# Patient Record
Sex: FEMALE | Race: WHITE | NOT HISPANIC OR LATINO | URBAN - METROPOLITAN AREA
[De-identification: names, ages, dates, MRNs, and addresses within clinical notes are randomized per-mention and may not be internally consistent; named-entity substitution may affect disease eponyms.]

---

## 2017-06-25 ENCOUNTER — EMERGENCY (EMERGENCY)
Facility: HOSPITAL | Age: 67
LOS: 1 days | Discharge: HOME | End: 2017-06-25
Attending: PERSONAL EMERGENCY RESPONSE ATTENDANT | Admitting: PERSONAL EMERGENCY RESPONSE ATTENDANT
Payer: MEDICARE

## 2017-06-25 VITALS
SYSTOLIC BLOOD PRESSURE: 195 MMHG | TEMPERATURE: 99 F | RESPIRATION RATE: 18 BRPM | DIASTOLIC BLOOD PRESSURE: 85 MMHG | HEART RATE: 79 BPM | OXYGEN SATURATION: 98 %

## 2017-06-25 PROCEDURE — 73562 X-RAY EXAM OF KNEE 3: CPT | Mod: 26,RT

## 2017-06-25 PROCEDURE — 73590 X-RAY EXAM OF LOWER LEG: CPT | Mod: 26,RT

## 2017-06-25 PROCEDURE — 99284 EMERGENCY DEPT VISIT MOD MDM: CPT | Mod: GC

## 2017-06-25 PROCEDURE — 73610 X-RAY EXAM OF ANKLE: CPT | Mod: 26,RT

## 2017-06-25 RX ORDER — OXYCODONE HYDROCHLORIDE 5 MG/1
5 TABLET ORAL ONCE
Qty: 0 | Refills: 0 | Status: DISCONTINUED | OUTPATIENT
Start: 2017-06-25 | End: 2017-06-25

## 2017-06-25 RX ORDER — MORPHINE SULFATE 50 MG/1
4 CAPSULE, EXTENDED RELEASE ORAL ONCE
Qty: 0 | Refills: 0 | Status: DISCONTINUED | OUTPATIENT
Start: 2017-06-25 | End: 2017-06-25

## 2017-06-25 RX ADMIN — MORPHINE SULFATE 4 MILLIGRAM(S): 50 CAPSULE, EXTENDED RELEASE ORAL at 23:46

## 2017-06-25 NOTE — ED PROVIDER NOTE - PROGRESS NOTE DETAILS
ankle reduced by ortho x2, will discharge and d/c home with ortho follow up, strict return precautions given. [employer]:  Pt being reduced and splinted by ortho.  Eval is subacute as fall was ~24 hrs + ago.  Pt seen at outside facility prior to presentation here however trimalleolar fx with sig ligamentous disruption.  Remains neurovascularly intact distal to site.  Pt pending post-reduction scans at time of signout to incoming team.  Per ortho pt will likely require operative repair on non-emergent basis. Stable for signout with planned discharge following RLE splinting and reduction films by ortho.

## 2017-06-25 NOTE — ED PROVIDER NOTE - OBJECTIVE STATEMENT
66 year old female, no past medical history, presents to the ED for right leg pain. Had fall yesterday in [place], went to ED there told she had fracture, put into splint. Patient tripped backwards on car divider. Patient now reports knee pain and thigh pain where ends of splint are digging into leg. Patient also had CT head done which was reportedly normal because she hit the back of her head. No LOC. Patient reports she has a laceration there that was reportedly not checked. Patient reporting 10/10 pain in lower leg. No fevers, chills, nausea, vomiting, chest pain, shortness of breath, abdominal pain. No other muscle/joint pain. Patient putting some weight on right leg while trying to use crutches.

## 2017-06-25 NOTE — ED ADULT NURSE NOTE - DISCHARGE TEACHING
Pt instructed to followup with PMD in 24-48 hours; instructed to followup with ortho, phone # provided; verbalized understanding.

## 2017-06-25 NOTE — ED PROVIDER NOTE - ATTENDING CONTRIBUTION TO CARE
Attending MD Mariscal.  Agree with above.  PT is a 66 yr old female presenting to ED with complaint of mechanical fall last night following which she was seen in an ER in [place] and dxed with a RLE fx and splinted.  She lives in this area most of the time and returned today with increasing pain to RLE.  Pt with RLE in splint distal to knee.  Splint removed on arrival with marked improvement in pain.  RLE is edematous with bruising over R medial maleolus and R proximal lateral fib/knee.  Distal sensation, warmth, cap refill, motor intact.  XR's ordered for probable maisonneuve fx. No other injuries sustained. Pt states that she had a negative head CT last night in [place].

## 2017-06-25 NOTE — ED PROVIDER NOTE - PLAN OF CARE
1) Take oxycodone as prescribed. Do not drive or operate heavy machinery while taking.   2) Follow up with orthopedics in 2-3 days, call [phone] for an appointment if you cannot see private physician.   3) Return to the ED for worsening pain, leg turns blue, nausea. vomiting, fever greater than 100.4, chest pain and shortness of breath, or if you have any other new, worsening, or concerning symptoms.

## 2017-06-25 NOTE — ED ADULT NURSE NOTE - OBJECTIVE STATEMENT
66 y.o F arrived to ED s/p fall yesterday evening around 2300. A&Ox3. As per pt, she tripped and fell while in [place], twisted R ankle, went to ED, had x-rays done and splint placed. Pt is unsure if reports done in ED report fx or break of R ankle. Pt states she hit head when she fell, no LOC, no blood thinners, states head scans were negative. States splint was uncomfortable. Denies PMH. Upon exam, swelling noted to R lower extremity below the knee. Tenderness to R knee/R ankle; bruising noted to R inner ankle. B/L lower extremities cap refill <2seconds, +peripheral pulses, sensation intact, able to wiggle toes. Respirations nonlabored O2 sat 98% RA; abdomen soft nontender; neuro intact. Denies CP, SOB, N/V/D, fevers, chills, bowel/bladder symptoms, dizziness, vision changes. Does not currently want pain medication; Safety and comfort provided, son at bedside.

## 2017-06-25 NOTE — ED PROVIDER NOTE - CARE PLAN
Principal Discharge DX:	Ankle fracture  Instructions for follow-up, activity and diet:	1) Take oxycodone as prescribed. Do not drive or operate heavy machinery while taking.   2) Follow up with orthopedics in 2-3 days, call [phone] for an appointment if you cannot see private physician.   3) Return to the ED for worsening pain, leg turns blue, nausea. vomiting, fever greater than 100.4, chest pain and shortness of breath, or if you have any other new, worsening, or concerning symptoms. Principal Discharge DX:	Trimalleolar fracture, right, closed, initial encounter  Instructions for follow-up, activity and diet:	1) Take oxycodone as prescribed. Do not drive or operate heavy machinery while taking.   2) Follow up with orthopedics in 2-3 days, call [phone] for an appointment if you cannot see private physician.   3) Return to the ED for worsening pain, leg turns blue, nausea. vomiting, fever greater than 100.4, chest pain and shortness of breath, or if you have any other new, worsening, or concerning symptoms.

## 2017-06-25 NOTE — ED PROVIDER NOTE - MEDICAL DECISION MAKING DETAILS
66 year old female, no past medical history, presents to the ED for right leg pain.  Pain improved after splint removal. Will repeat x-rays and resplint. 66 year old female, no past medical history, presents to the ED for right leg pain.  Pain improved after splint removal. Will repeat x-rays and resplint. Declined pain meds

## 2017-06-26 VITALS
DIASTOLIC BLOOD PRESSURE: 93 MMHG | SYSTOLIC BLOOD PRESSURE: 180 MMHG | TEMPERATURE: 99 F | RESPIRATION RATE: 18 BRPM | HEART RATE: 70 BPM | OXYGEN SATURATION: 98 %

## 2017-06-26 PROCEDURE — 99285 EMERGENCY DEPT VISIT HI MDM: CPT | Mod: 25

## 2017-06-26 PROCEDURE — 73610 X-RAY EXAM OF ANKLE: CPT

## 2017-06-26 PROCEDURE — 27818 TREATMENT OF ANKLE FRACTURE: CPT | Mod: RT

## 2017-06-26 PROCEDURE — 73600 X-RAY EXAM OF ANKLE: CPT | Mod: 26,RT

## 2017-06-26 PROCEDURE — 73590 X-RAY EXAM OF LOWER LEG: CPT

## 2017-06-26 PROCEDURE — 73562 X-RAY EXAM OF KNEE 3: CPT

## 2017-06-26 PROCEDURE — 96374 THER/PROPH/DIAG INJ IV PUSH: CPT | Mod: XU

## 2017-06-26 PROCEDURE — 73600 X-RAY EXAM OF ANKLE: CPT

## 2017-06-26 RX ORDER — OXYCODONE HYDROCHLORIDE 5 MG/1
1 TABLET ORAL
Qty: 12 | Refills: 0 | OUTPATIENT
Start: 2017-06-26 | End: 2017-06-29

## 2017-06-26 NOTE — CONSULT NOTE ADULT - SUBJECTIVE AND OBJECTIVE BOX
66yFemale p/w R ankle pain/deformity and inability to bear weight s/p mechanical fall last night. Patient was in [place], [place] and visited ED there where she was placed in orthoglass splint without reduction and discharged. Denies headstrike/LOC. Denies numbness/tingling in the feet/toes. No other bone or joint complaints.     MEDICATIONS  (STANDING):    Allergies    No Known Allergies    Intolerances    Vital Signs Last 24 Hrs  T(C): 37.1, Max: 37.1 (06-25 @ 20:42)  T(F): 98.7, Max: 98.7 (06-25 @ 20:42)  HR: 73 (73 - 79)  BP: 179/96 (179/96 - 195/85)  BP(mean): --  RR: 18 (18 - 18)  SpO2: 99% (98% - 99%)    Imaging: XR demonstrates R ankle trimalleolar fracture    Physical Exam  Gen: Nad  RLE: Skin intact, +TTP medial/lateral malleolus  motor intact distally  SILT s/s/sp/dp/t  2+ DP    Procedure: R ankle closed reduction and placed in well padded trilam splint. NVI post-procedure, XR confirmed improved alignment.    A/P: 54y Female with R ankle fracture s/p CR and splinting  - Pain control  - NWB RLE in splint, keep c/d/I, cane/crutches/walker as needed  - Ice/elevation  - Follow up with Dr. Duarte in 1 week, call [phone] for appointment

## 2017-07-03 ENCOUNTER — APPOINTMENT (OUTPATIENT)
Dept: ORTHOPEDIC SURGERY | Facility: CLINIC | Age: 67
End: 2017-07-03

## 2017-07-03 VITALS — BODY MASS INDEX: 36.07 KG/M2 | WEIGHT: 196 LBS | HEIGHT: 62 IN

## 2017-07-07 ENCOUNTER — OUTPATIENT (OUTPATIENT)
Dept: OUTPATIENT SERVICES | Facility: HOSPITAL | Age: 67
LOS: 1 days | Discharge: HOME | End: 2017-07-07

## 2017-07-07 VITALS
WEIGHT: 189.82 LBS | RESPIRATION RATE: 18 BRPM | HEART RATE: 68 BPM | SYSTOLIC BLOOD PRESSURE: 157 MMHG | TEMPERATURE: 98 F | HEIGHT: 62 IN | OXYGEN SATURATION: 98 % | DIASTOLIC BLOOD PRESSURE: 92 MMHG

## 2017-07-07 DIAGNOSIS — S82.851A DISPLACED TRIMALLEOLAR FRACTURE OF RIGHT LOWER LEG, INITIAL ENCOUNTER FOR CLOSED FRACTURE: ICD-10-CM

## 2017-07-07 DIAGNOSIS — Z01.818 ENCOUNTER FOR OTHER PREPROCEDURAL EXAMINATION: ICD-10-CM

## 2017-07-07 DIAGNOSIS — Z98.891 HISTORY OF UTERINE SCAR FROM PREVIOUS SURGERY: Chronic | ICD-10-CM

## 2017-07-07 DIAGNOSIS — M25.571 PAIN IN RIGHT ANKLE AND JOINTS OF RIGHT FOOT: ICD-10-CM

## 2017-07-07 LAB
ALBUMIN SERPL ELPH-MCNC: 3.5 G/DL — SIGNIFICANT CHANGE UP (ref 3.3–5)
ALP SERPL-CCNC: 74 U/L — SIGNIFICANT CHANGE UP (ref 40–120)
ALT FLD-CCNC: 35 U/L — SIGNIFICANT CHANGE UP (ref 12–78)
ANION GAP SERPL CALC-SCNC: 7 MMOL/L — SIGNIFICANT CHANGE UP (ref 5–17)
APTT BLD: 31.8 SEC — SIGNIFICANT CHANGE UP (ref 27.5–37.4)
AST SERPL-CCNC: 18 U/L — SIGNIFICANT CHANGE UP (ref 15–37)
BASOPHILS # BLD AUTO: 0.1 K/UL — SIGNIFICANT CHANGE UP (ref 0–0.2)
BASOPHILS NFR BLD AUTO: 1.4 % — SIGNIFICANT CHANGE UP (ref 0–2)
BILIRUB SERPL-MCNC: 0.7 MG/DL — SIGNIFICANT CHANGE UP (ref 0.2–1.2)
BUN SERPL-MCNC: 10 MG/DL — SIGNIFICANT CHANGE UP (ref 7–23)
CALCIUM SERPL-MCNC: 9.7 MG/DL — SIGNIFICANT CHANGE UP (ref 8.5–10.1)
CHLORIDE SERPL-SCNC: 107 MMOL/L — SIGNIFICANT CHANGE UP (ref 96–108)
CO2 SERPL-SCNC: 28 MMOL/L — SIGNIFICANT CHANGE UP (ref 22–31)
CREAT SERPL-MCNC: 0.76 MG/DL — SIGNIFICANT CHANGE UP (ref 0.5–1.3)
EOSINOPHIL # BLD AUTO: 0.2 K/UL — SIGNIFICANT CHANGE UP (ref 0–0.5)
EOSINOPHIL NFR BLD AUTO: 2.3 % — SIGNIFICANT CHANGE UP (ref 0–6)
GLUCOSE SERPL-MCNC: 113 MG/DL — HIGH (ref 70–99)
HCT VFR BLD CALC: 41.5 % — SIGNIFICANT CHANGE UP (ref 34.5–45)
HGB BLD-MCNC: 14.5 G/DL — SIGNIFICANT CHANGE UP (ref 11.5–15.5)
INR BLD: 1.13 RATIO — SIGNIFICANT CHANGE UP (ref 0.88–1.16)
LYMPHOCYTES # BLD AUTO: 1.1 K/UL — SIGNIFICANT CHANGE UP (ref 1–3.3)
LYMPHOCYTES # BLD AUTO: 15 % — SIGNIFICANT CHANGE UP (ref 13–44)
MCHC RBC-ENTMCNC: 31.9 PG — SIGNIFICANT CHANGE UP (ref 27–34)
MCHC RBC-ENTMCNC: 34.9 GM/DL — SIGNIFICANT CHANGE UP (ref 32–36)
MCV RBC AUTO: 91.3 FL — SIGNIFICANT CHANGE UP (ref 80–100)
MONOCYTES # BLD AUTO: 0.6 K/UL — SIGNIFICANT CHANGE UP (ref 0–0.9)
MONOCYTES NFR BLD AUTO: 8.6 % — SIGNIFICANT CHANGE UP (ref 2–14)
NEUTROPHILS # BLD AUTO: 5.3 K/UL — SIGNIFICANT CHANGE UP (ref 1.8–7.4)
NEUTROPHILS NFR BLD AUTO: 72.8 % — SIGNIFICANT CHANGE UP (ref 43–77)
PLATELET # BLD AUTO: 379 K/UL — SIGNIFICANT CHANGE UP (ref 150–400)
POTASSIUM SERPL-MCNC: 4.3 MMOL/L — SIGNIFICANT CHANGE UP (ref 3.5–5.3)
POTASSIUM SERPL-SCNC: 4.3 MMOL/L — SIGNIFICANT CHANGE UP (ref 3.5–5.3)
PROT SERPL-MCNC: 8 GM/DL — SIGNIFICANT CHANGE UP (ref 6–8.3)
PROTHROM AB SERPL-ACNC: 12.4 SEC — SIGNIFICANT CHANGE UP (ref 9.8–12.7)
RBC # BLD: 4.55 M/UL — SIGNIFICANT CHANGE UP (ref 3.8–5.2)
RBC # FLD: 11.2 % — SIGNIFICANT CHANGE UP (ref 11–15)
SODIUM SERPL-SCNC: 142 MMOL/L — SIGNIFICANT CHANGE UP (ref 135–145)
WBC # BLD: 7.2 K/UL — SIGNIFICANT CHANGE UP (ref 3.8–10.5)
WBC # FLD AUTO: 7.2 K/UL — SIGNIFICANT CHANGE UP (ref 3.8–10.5)

## 2017-07-07 NOTE — H&P PST ADULT - ATTENDING COMMENTS
Right bimalleolar ankle fracture indicated for operative fixation. All R/B/As discussed with the patient. All questions answered.

## 2017-07-07 NOTE — H&P PST ADULT - HISTORY OF PRESENT ILLNESS
66F 66F no PMH presents to PST with right ankle fracture s/p fall in [facility]. Patient is here today for Pre-surgical clearance for elective 66F with h/o HTN presents to PST with right ankle fracture s/p fall in [facility] [place] [facility] and subsequent splint placement in [facility] ED. Patient is here today for Pre-surgical clearance for elective ORIF R ankle. 66F with h/o HTN and asthma presents to PST with right ankle fracture s/p fall in [facility] and subsequent splint placement in [facility] ED. Patient is here today for Pre-surgical clearance for elective ORIF R ankle. Pt admits she self discontinued her asthma and HTN meds years ago and has not seen a PCP since. 66F with h/o HTN and asthma presents to PST with right ankle fracture s/p fall in [facility] and subsequent splint placement in [facility] ED. Patient is here today for Pre-surgical clearance for elective ORIF R ankle.  She reports she hit her head during fall and had a head CT performed in [place] - reportedly normal.   Pt admits she self discontinued her asthma and HTN meds years ago and has not seen a PCP since.

## 2017-07-07 NOTE — H&P PST ADULT - PROBLEM SELECTOR PLAN 1
for ORIF for [facility] 7/12/17  preop labs for ORIF 7/12/17  preop labs  patient encouraged to Follow up with a PCP in future

## 2017-07-07 NOTE — H&P PST ADULT - PMH
No pertinent past medical history Essential hypertension Essential hypertension    Uncomplicated asthma, unspecified asthma severity

## 2017-07-07 NOTE — H&P PST ADULT - VENOUS THROMBOEMBOLISM CURRENT STATUS
leg plaster cast or brace/major surgery, including arthroscopic and laparoscopic (greater than 1 hr)

## 2017-07-07 NOTE — H&P PST ADULT - ASSESSMENT
66F with right ankle fracture 66F h/o HTN/asthma, presently untreated, with displaced trimalleolar right ankle fracture

## 2017-07-11 ENCOUNTER — FORM ENCOUNTER (OUTPATIENT)
Age: 67
End: 2017-07-11

## 2017-07-12 ENCOUNTER — OUTPATIENT (OUTPATIENT)
Dept: OUTPATIENT SERVICES | Facility: HOSPITAL | Age: 67
LOS: 1 days | Discharge: HOME | End: 2017-07-12
Payer: MEDICARE

## 2017-07-12 ENCOUNTER — TRANSCRIPTION ENCOUNTER (OUTPATIENT)
Age: 67
End: 2017-07-12

## 2017-07-12 ENCOUNTER — APPOINTMENT (OUTPATIENT)
Dept: ORTHOPEDIC SURGERY | Facility: HOSPITAL | Age: 67
End: 2017-07-12

## 2017-07-12 VITALS
SYSTOLIC BLOOD PRESSURE: 148 MMHG | RESPIRATION RATE: 16 BRPM | TEMPERATURE: 98 F | HEART RATE: 70 BPM | DIASTOLIC BLOOD PRESSURE: 84 MMHG | OXYGEN SATURATION: 98 %

## 2017-07-12 VITALS
OXYGEN SATURATION: 97 % | HEART RATE: 67 BPM | WEIGHT: 189.82 LBS | SYSTOLIC BLOOD PRESSURE: 148 MMHG | HEIGHT: 62 IN | DIASTOLIC BLOOD PRESSURE: 80 MMHG | TEMPERATURE: 97 F | RESPIRATION RATE: 15 BRPM

## 2017-07-12 DIAGNOSIS — Z98.891 HISTORY OF UTERINE SCAR FROM PREVIOUS SURGERY: Chronic | ICD-10-CM

## 2017-07-12 PROCEDURE — 27822 TREATMENT OF ANKLE FRACTURE: CPT | Mod: RT

## 2017-07-12 RX ORDER — FENTANYL CITRATE 50 UG/ML
25 INJECTION INTRAVENOUS
Qty: 0 | Refills: 0 | Status: DISCONTINUED | OUTPATIENT
Start: 2017-07-12 | End: 2017-07-12

## 2017-07-12 RX ORDER — ACETAMINOPHEN WITH CODEINE 300MG-30MG
1 TABLET ORAL
Qty: 0 | Refills: 0 | COMMUNITY

## 2017-07-12 RX ORDER — ACETAMINOPHEN 500 MG
1000 TABLET ORAL ONCE
Qty: 0 | Refills: 0 | Status: COMPLETED | OUTPATIENT
Start: 2017-07-12 | End: 2017-07-12

## 2017-07-12 RX ORDER — SODIUM CHLORIDE 9 MG/ML
1000 INJECTION, SOLUTION INTRAVENOUS
Qty: 0 | Refills: 0 | Status: DISCONTINUED | OUTPATIENT
Start: 2017-07-12 | End: 2017-07-12

## 2017-07-12 RX ORDER — ONDANSETRON 8 MG/1
1 TABLET, FILM COATED ORAL
Qty: 10 | Refills: 0 | OUTPATIENT
Start: 2017-07-12

## 2017-07-12 RX ORDER — ASPIRIN/CALCIUM CARB/MAGNESIUM 324 MG
1 TABLET ORAL
Qty: 14 | Refills: 0 | OUTPATIENT
Start: 2017-07-12 | End: 2017-07-26

## 2017-07-12 RX ORDER — OXYCODONE HYDROCHLORIDE 5 MG/1
1 TABLET ORAL
Qty: 60 | Refills: 0 | OUTPATIENT
Start: 2017-07-12

## 2017-07-12 RX ADMIN — SODIUM CHLORIDE 75 MILLILITER(S): 9 INJECTION, SOLUTION INTRAVENOUS at 11:02

## 2017-07-12 RX ADMIN — FENTANYL CITRATE 25 MICROGRAM(S): 50 INJECTION INTRAVENOUS at 11:01

## 2017-07-12 RX ADMIN — Medication 400 MILLIGRAM(S): at 11:01

## 2017-07-12 RX ADMIN — Medication 1000 MILLIGRAM(S): at 11:23

## 2017-07-12 RX ADMIN — FENTANYL CITRATE 25 MICROGRAM(S): 50 INJECTION INTRAVENOUS at 11:23

## 2017-07-12 NOTE — ASU DISCHARGE PLAN (ADULT/PEDIATRIC). - MEDICATION SUMMARY - MEDICATIONS TO TAKE
I will START or STAY ON the medications listed below when I get home from the hospital:    acetaminophen-oxycodone 325 mg-5 mg oral tablet  -- 1-2 tab(s) by mouth every 4-6 hours, As Needed -for severe pain MDD:12 tabs  -- Caution federal law prohibits the transfer of this drug to any person other  than the person for whom it was prescribed.  May cause drowsiness.  Alcohol may intensify this effect.  Use care when operating dangerous machinery.  This prescription cannot be refilled.  This product contains acetaminophen.  Do not use  with any other product containing acetaminophen to prevent possible liver damage.  Using more of this medication than prescribed may cause serious breathing problems.    -- Indication: For Pain    aspirin 325 mg oral tablet  -- 1 tab(s) by mouth once a day  -- Take with food or milk.    -- Indication: For DVT ppx    ondansetron 4 mg oral tablet, disintegrating  -- 1 tab(s) by mouth every 6 hours, As Needed -for nausea  -- Indication: For Nausea/Vomiting

## 2017-07-12 NOTE — BRIEF OPERATIVE NOTE - PRE-OP DX
Trimalleolar fracture of ankle, closed, right, with routine healing, subsequent encounter  07/12/2017    Active  Rodo Campbell

## 2017-07-12 NOTE — ASU DISCHARGE PLAN (ADULT/PEDIATRIC). - SPECIAL INSTRUCTIONS
No weight bearing Right leg, ambulate with crutches or walker  Elevate the right leg to decrease pain/swelling  Keep splint clean/dry/intact at all times

## 2017-07-12 NOTE — ASU DISCHARGE PLAN (ADULT/PEDIATRIC). - ACTIVITY LEVEL
no sports/gym/no weight bearing/no exercise/Ambulate with crutches or walker/elevate extremity/no heavy lifting

## 2017-07-12 NOTE — ASU DISCHARGE PLAN (ADULT/PEDIATRIC). - MEDICATION SUMMARY - MEDICATIONS TO STOP TAKING
I will STOP taking the medications listed below when I get home from the hospital:    oxyCODONE 5 mg oral tablet  -- 1 tab(s) by mouth every 6 hours, As Needed -for severe pain MDD:20 mg  -- Caution federal law prohibits the transfer of this drug to any person other  than the person for whom it was prescribed.  It is very important that you take or use this exactly as directed.  Do not skip doses or discontinue unless directed by your doctor.  May cause drowsiness.  Alcohol may intensify this effect.  Use care when operating dangerous machinery.  This prescription cannot be refilled.  Using more of this medication than prescribed may cause serious breathing problems.    Tylenol with Codeine #3 oral tablet  -- 1 tab(s) by mouth once a day, As Needed

## 2017-07-14 DIAGNOSIS — S82.851A DISPLACED TRIMALLEOLAR FRACTURE OF RIGHT LOWER LEG, INITIAL ENCOUNTER FOR CLOSED FRACTURE: ICD-10-CM

## 2017-07-14 DIAGNOSIS — I10 ESSENTIAL (PRIMARY) HYPERTENSION: ICD-10-CM
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
## 2017-07-14 DIAGNOSIS — J45.909 UNSPECIFIED ASTHMA, UNCOMPLICATED: ICD-10-CM

## 2017-07-14 DIAGNOSIS — M25.571 PAIN IN RIGHT ANKLE AND JOINTS OF RIGHT FOOT: ICD-10-CM

## 2017-07-14 DIAGNOSIS — Z91.013 ALLERGY TO SEAFOOD: ICD-10-CM

## 2017-07-14 DIAGNOSIS — Z79.891 LONG TERM (CURRENT) USE OF OPIATE ANALGESIC: ICD-10-CM
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
## 2017-07-21 ENCOUNTER — APPOINTMENT (OUTPATIENT)
Dept: ORTHOPEDIC SURGERY | Facility: CLINIC | Age: 67
End: 2017-07-21

## 2017-07-21 VITALS — HEART RATE: 14 BPM | WEIGHT: 196 LBS | HEIGHT: 62 IN | BODY MASS INDEX: 36.07 KG/M2

## 2017-07-31 ENCOUNTER — APPOINTMENT (OUTPATIENT)
Dept: ORTHOPEDIC SURGERY | Facility: CLINIC | Age: 67
End: 2017-07-31
Payer: MEDICARE

## 2017-07-31 VITALS — BODY MASS INDEX: 36.07 KG/M2 | WEIGHT: 196 LBS | HEIGHT: 62 IN | RESPIRATION RATE: 14 BRPM

## 2017-07-31 DIAGNOSIS — Z78.9 OTHER SPECIFIED HEALTH STATUS: ICD-10-CM

## 2017-07-31 DIAGNOSIS — Z80.9 FAMILY HISTORY OF MALIGNANT NEOPLASM, UNSPECIFIED: ICD-10-CM

## 2017-07-31 DIAGNOSIS — Z87.09 PERSONAL HISTORY OF OTHER DISEASES OF THE RESPIRATORY SYSTEM: ICD-10-CM

## 2017-07-31 PROCEDURE — 99024 POSTOP FOLLOW-UP VISIT: CPT

## 2017-08-01 PROBLEM — Z87.09 HISTORY OF ASTHMA: Status: RESOLVED | Noted: 2017-07-03 | Resolved: 2017-08-01

## 2017-08-01 PROBLEM — Z80.9 FAMILY HISTORY OF MALIGNANT NEOPLASM: Status: ACTIVE | Noted: 2017-07-03

## 2017-08-01 PROBLEM — Z78.9 CURRENT NON-SMOKER: Status: ACTIVE | Noted: 2017-07-03

## 2017-08-01 PROBLEM — Z78.9 EXERCISES OCCASIONALLY: Status: ACTIVE | Noted: 2017-07-03

## 2017-08-07 ENCOUNTER — FORM ENCOUNTER (OUTPATIENT)
Age: 67
End: 2017-08-07

## 2017-08-08 ENCOUNTER — APPOINTMENT (OUTPATIENT)
Dept: ORTHOPEDIC SURGERY | Facility: CLINIC | Age: 67
End: 2017-08-08
Payer: MEDICARE

## 2017-08-08 ENCOUNTER — OUTPATIENT (OUTPATIENT)
Dept: OUTPATIENT SERVICES | Facility: HOSPITAL | Age: 67
LOS: 1 days | End: 2017-08-08
Payer: MEDICARE

## 2017-08-08 ENCOUNTER — APPOINTMENT (OUTPATIENT)
Dept: ULTRASOUND IMAGING | Facility: CLINIC | Age: 67
End: 2017-08-08
Payer: MEDICARE

## 2017-08-08 VITALS
BODY MASS INDEX: 33.13 KG/M2 | HEIGHT: 62 IN | SYSTOLIC BLOOD PRESSURE: 169 MMHG | HEART RATE: 59 BPM | DIASTOLIC BLOOD PRESSURE: 90 MMHG | WEIGHT: 180 LBS

## 2017-08-08 DIAGNOSIS — S82.851A DISPLACED TRIMALLEOLAR FRACTURE OF RIGHT LOWER LEG, INITIAL ENCOUNTER FOR CLOSED FRACTURE: ICD-10-CM

## 2017-08-08 DIAGNOSIS — Z98.891 HISTORY OF UTERINE SCAR FROM PREVIOUS SURGERY: Chronic | ICD-10-CM

## 2017-08-08 PROCEDURE — 93971 EXTREMITY STUDY: CPT

## 2017-08-08 PROCEDURE — 99024 POSTOP FOLLOW-UP VISIT: CPT

## 2017-08-08 PROCEDURE — 73610 X-RAY EXAM OF ANKLE: CPT | Mod: RT

## 2017-08-08 PROCEDURE — 93971 EXTREMITY STUDY: CPT | Mod: 26,RT

## 2017-08-11 ENCOUNTER — APPOINTMENT (OUTPATIENT)
Dept: ORTHOPEDIC SURGERY | Facility: CLINIC | Age: 67
End: 2017-08-11
Payer: MEDICARE

## 2017-08-11 ENCOUNTER — APPOINTMENT (OUTPATIENT)
Dept: ORTHOPEDIC SURGERY | Facility: CLINIC | Age: 67
End: 2017-08-11

## 2017-08-11 VITALS — RESPIRATION RATE: 14 BRPM | BODY MASS INDEX: 33.13 KG/M2 | WEIGHT: 180 LBS | HEIGHT: 62 IN

## 2017-08-11 PROCEDURE — 99024 POSTOP FOLLOW-UP VISIT: CPT

## 2017-08-28 ENCOUNTER — APPOINTMENT (OUTPATIENT)
Dept: ORTHOPEDIC SURGERY | Facility: CLINIC | Age: 67
End: 2017-08-28
Payer: MEDICARE

## 2017-08-28 PROCEDURE — 73610 X-RAY EXAM OF ANKLE: CPT | Mod: RT

## 2017-08-28 PROCEDURE — 99024 POSTOP FOLLOW-UP VISIT: CPT

## 2017-10-20 ENCOUNTER — APPOINTMENT (OUTPATIENT)
Dept: ORTHOPEDIC SURGERY | Facility: CLINIC | Age: 67
End: 2017-10-20
Payer: MEDICARE

## 2017-10-20 VITALS — RESPIRATION RATE: 14 BRPM | BODY MASS INDEX: 33.13 KG/M2 | WEIGHT: 180 LBS | HEIGHT: 62 IN

## 2017-10-20 PROCEDURE — 99213 OFFICE O/P EST LOW 20 MIN: CPT

## 2017-10-20 PROCEDURE — 73610 X-RAY EXAM OF ANKLE: CPT | Mod: RT

## 2018-01-26 ENCOUNTER — APPOINTMENT (OUTPATIENT)
Dept: ORTHOPEDIC SURGERY | Facility: CLINIC | Age: 68
End: 2018-01-26
Payer: MEDICARE

## 2018-01-26 VITALS — BODY MASS INDEX: 33.13 KG/M2 | RESPIRATION RATE: 14 BRPM | HEIGHT: 62 IN | WEIGHT: 180 LBS

## 2018-01-26 DIAGNOSIS — Z00.00 ENCOUNTER FOR GENERAL ADULT MEDICAL EXAMINATION W/OUT ABNORMAL FINDINGS: ICD-10-CM

## 2018-01-26 PROCEDURE — 73610 X-RAY EXAM OF ANKLE: CPT | Mod: RT

## 2018-01-26 PROCEDURE — 99214 OFFICE O/P EST MOD 30 MIN: CPT

## 2018-05-04 ENCOUNTER — APPOINTMENT (OUTPATIENT)
Dept: ORTHOPEDIC SURGERY | Facility: CLINIC | Age: 68
End: 2018-05-04
Payer: MEDICARE

## 2018-05-04 DIAGNOSIS — S82.851A DISPLACED TRIMALLEOLAR FRACTURE OF RIGHT LOWER LEG, INITIAL ENCOUNTER FOR CLOSED FRACTURE: ICD-10-CM

## 2018-05-04 PROCEDURE — 73610 X-RAY EXAM OF ANKLE: CPT | Mod: RT

## 2018-05-04 PROCEDURE — 99214 OFFICE O/P EST MOD 30 MIN: CPT

## 2018-06-14 ENCOUNTER — FORM ENCOUNTER (OUTPATIENT)
Age: 68
End: 2018-06-14

## 2018-06-15 ENCOUNTER — APPOINTMENT (OUTPATIENT)
Dept: MRI IMAGING | Facility: CLINIC | Age: 68
End: 2018-06-15
Payer: MEDICARE

## 2018-06-15 ENCOUNTER — OUTPATIENT (OUTPATIENT)
Dept: OUTPATIENT SERVICES | Facility: HOSPITAL | Age: 68
LOS: 1 days | End: 2018-06-15
Payer: MEDICARE

## 2018-06-15 DIAGNOSIS — Z00.8 ENCOUNTER FOR OTHER GENERAL EXAMINATION: ICD-10-CM

## 2018-06-15 DIAGNOSIS — Z98.891 HISTORY OF UTERINE SCAR FROM PREVIOUS SURGERY: Chronic | ICD-10-CM

## 2018-06-15 PROCEDURE — 73721 MRI JNT OF LWR EXTRE W/O DYE: CPT | Mod: 26,RT

## 2018-06-15 PROCEDURE — 73721 MRI JNT OF LWR EXTRE W/O DYE: CPT

## 2019-05-15 NOTE — ASU DISCHARGE PLAN (ADULT/PEDIATRIC). - NOTIFY
Pt.'s mom picked up paperwork, ID was presented.
School physical form has been filled out and placed at  ready for . I took two copies of form, one was sent to scanned and other form placed in [place] [address] [address] back office.
Numbness, color, or temperature change to extremity/Pain not relieved by Medications/Fever greater than 101/Bleeding that does not stop

## 2019-05-28 PROBLEM — J45.909 UNSPECIFIED ASTHMA, UNCOMPLICATED: Chronic | Status: ACTIVE | Noted: 2017-07-07

## 2019-05-28 PROBLEM — I10 ESSENTIAL (PRIMARY) HYPERTENSION: Chronic | Status: ACTIVE | Noted: 2017-07-07

## 2019-05-29 ENCOUNTER — APPOINTMENT (OUTPATIENT)
Dept: ORTHOPEDIC SURGERY | Facility: CLINIC | Age: 69
End: 2019-05-29
Payer: MEDICARE

## 2019-05-29 VITALS — BODY MASS INDEX: 33.13 KG/M2 | HEIGHT: 62 IN | WEIGHT: 180 LBS

## 2019-05-29 DIAGNOSIS — M25.562 PAIN IN RIGHT KNEE: ICD-10-CM

## 2019-05-29 DIAGNOSIS — M25.561 PAIN IN RIGHT KNEE: ICD-10-CM

## 2019-05-29 DIAGNOSIS — M23.92 UNSPECIFIED INTERNAL DERANGEMENT OF LEFT KNEE: ICD-10-CM

## 2019-05-29 DIAGNOSIS — M25.562 PAIN IN LEFT KNEE: ICD-10-CM

## 2019-05-29 PROCEDURE — 73562 X-RAY EXAM OF KNEE 3: CPT | Mod: 50

## 2019-05-29 PROCEDURE — 20610 DRAIN/INJ JOINT/BURSA W/O US: CPT | Mod: LT

## 2019-05-29 PROCEDURE — 99214 OFFICE O/P EST MOD 30 MIN: CPT | Mod: 25

## 2019-05-29 NOTE — HISTORY OF PRESENT ILLNESS
[de-identified] : Pt is a 69 y/o female c/o left knee pain x 3 days.  She has had pain in the right knee intermittently as well but the left knee pain is severe.  She has pain when she walks and stands.  It is painful to stand from a sitting position. She has relief with rest.  The pain is severe when she stands and has an aching quality when she sits.  Denies buckling, locking, numbness or tingling.  She took Motrin for pain yesterday but it did not help much.\par She is s/p right ankle ORIF in July 2017 by Dr. Dawson.  She states that she has tightness in the ankle.

## 2019-05-29 NOTE — PROCEDURE
[de-identified] : Procedure Note:\par \par Anatomic Location:  Left Knee\par \par Diagnosis:  Arthritis\par \par Procedure:  Injection of 2cc  of Marcaine 0.25% plain and Celestone 1cc, 6mg\par \par Local Spray: Ethyl Chloride.\par \par \par Patient has consented for the procedure.\par \par Injection  through a lateral parapatella approach.\par \par Patient tolerated the procedure well.\par \par Patient instructed to call the office if any reaction, fever, chills, increased erythema or swelling.   [phone].

## 2019-05-29 NOTE — DISCUSSION/SUMMARY
[de-identified] : We will get an MRI of her left knee to better evaluate the medial lateral compartments.  She does have severe patellofemoral arthritis.  Return visit in 6 weeks.

## 2019-05-29 NOTE — PHYSICAL EXAM
[de-identified] : Constitutional - the patient is of normal build and nutrition.  The patient remains oriented to person, time, and  place.  Mood is normal. Vital signs as recorded are within normal limits.  The patients gait is pain at this time in her left knee when putting weight on it trying to go up a step or when twisting it.. The patient has satisfactory  balance and can to  easily walk on toes and heels.\par \par The patient has no difficulty with respiration. Respiration at rest is a normal rate. The patient is not short of breath and has not become short of breath with short ambulation. There is no audible wheezing. No coughing.\par \par Skin is normal for the patient's age. There are no abnormal masses or lymph nodes which stand out in the lower extremities.\par \par Spine - deep tendon reflexes are symmetric\par \par \par UPPER EXTREMITIES \par \par Shoulders ROM  is symmetric  and the motion is satisfactory.  There is no significant shoulder pain or limitation in motion which would make using a cane or a walker difficult. Shoulder stability and  strength are satisfactory.\par \par Circulation appears satisfactory with pedal pulses present.  There is no major edema in the lower legs. No skin tenderness or increased temperature. No major varicosities.\par \par HIP EXAMINATION the abduction and abduction as well as rotation measurements were taken with the hip in flexion.\par \par Motion\par Hip flexion                               *[Right 135   Left 135]\par Hip abduction                         *[Right 70      Left 70]\par Hip adduction                         *[Right 35     Left 35]\par Hip external  Rotation             *[Right 65     Left 65]\par Hip Internal Rotation              *[Right 20      Left 20]\par Hip Extension                         *[Right 0        Left 0]\par \par The hips have good range of motion. There is good strength across the hips. There is no crepitus in either hip. The alignment of the hips are normal.\par \par \par KNEE EXAMINATION\par \par Motion\par Right Knee      , 0 to 125 degrees.  She has patellofemoral crepitus.  She has no effusion no Baker's cyst.  She does have some tenderness with compression over her patella and then attempted to subluxate the patella gives her pain.  She has good medial lateral and anterior posterior stability.\par \par Knee also goes from 0 to 125 degrees here she has some increased pain over her lateral compartment and an increased lateral Steinmann test.  She also has pain with compression of her patella and patellofemoral crepitus.  She may have a small effusion she does not have a Baker's cyst.  She has good medial lateral of the anterior posterior stability.          \par \par \par Ankle and foot examination\par Of the ankle has normal motion.  There is normal ankle stability.  The patient has no major abnormalities of the foot.\par \par \par \par  [de-identified] :  threeViews of the right left knee show joint spaces are generally maintained on the standing view skyline view show bone against bone contact throughout the lateral facet with severe patellofemoral degenerative arthritis bilaterally this is also seen on the lateral view.

## 2019-06-10 ENCOUNTER — FORM ENCOUNTER (OUTPATIENT)
Age: 69
End: 2019-06-10

## 2019-06-11 ENCOUNTER — OUTPATIENT (OUTPATIENT)
Dept: OUTPATIENT SERVICES | Facility: HOSPITAL | Age: 69
LOS: 1 days | End: 2019-06-11
Payer: MEDICARE

## 2019-06-11 ENCOUNTER — MEDICATION RENEWAL (OUTPATIENT)
Age: 69
End: 2019-06-11

## 2019-06-11 ENCOUNTER — APPOINTMENT (OUTPATIENT)
Dept: MRI IMAGING | Facility: IMAGING CENTER | Age: 69
End: 2019-06-11
Payer: MEDICARE

## 2019-06-11 DIAGNOSIS — Z98.891 HISTORY OF UTERINE SCAR FROM PREVIOUS SURGERY: Chronic | ICD-10-CM

## 2019-06-11 DIAGNOSIS — M23.92 UNSPECIFIED INTERNAL DERANGEMENT OF LEFT KNEE: ICD-10-CM

## 2019-06-11 PROCEDURE — 73721 MRI JNT OF LWR EXTRE W/O DYE: CPT | Mod: 26,LT

## 2019-06-11 PROCEDURE — 73721 MRI JNT OF LWR EXTRE W/O DYE: CPT

## 2020-03-10 ENCOUNTER — APPOINTMENT (OUTPATIENT)
Dept: ORTHOPEDIC SURGERY | Facility: CLINIC | Age: 70
End: 2020-03-10
Payer: MEDICARE

## 2020-03-10 VITALS — BODY MASS INDEX: 33.13 KG/M2 | WEIGHT: 180 LBS | HEIGHT: 62 IN

## 2020-03-10 DIAGNOSIS — M17.11 UNILATERAL PRIMARY OSTEOARTHRITIS, RIGHT KNEE: ICD-10-CM

## 2020-03-10 DIAGNOSIS — M17.12 UNILATERAL PRIMARY OSTEOARTHRITIS, LEFT KNEE: ICD-10-CM

## 2020-03-10 PROCEDURE — 73564 X-RAY EXAM KNEE 4 OR MORE: CPT | Mod: 50

## 2020-03-10 PROCEDURE — 99213 OFFICE O/P EST LOW 20 MIN: CPT

## 2020-03-10 NOTE — HISTORY OF PRESENT ILLNESS
[de-identified] : Patient returns to office c/o bilateral knee pain (L > R).\par She was last seen in May 2019 when she received a cortisone injection to her left knee, which helped.\par MRI done in June 2019 showed severe tricompartmental osteoarthritis in the left knee.\par She has pain more so with going up and down stairs and getting up from a seated position.\par She intermittently has taken Ibuprofen for the pain, which has helped to a certain extent.\par She returns to office today for re-evaluation. She would also like to discuss PT for her knees.

## 2020-03-10 NOTE — DISCUSSION/SUMMARY
[de-identified] : This time she is managing she will try Celebrex 200 mg 1 a day to see if this gives her some more comfort in her knees but she does not want local injections and she does not want to consider any type of surgery.  Return visit in 6 months to a year or sooner if needed.

## 2020-03-10 NOTE — PHYSICAL EXAM
Ms. Taryn Massey daughter, Archana Samaniego  called requesting to speak with the nurse in reference to the antibiotic prescribed recently. Daughter states that mom cannot swallow pills. She has even tried applesauce, but its still not going down. She would like a return call to discuss other options [phone]. [de-identified] : \par \par KNEE EXAMINATION\par Her knee exam is not significantly changed.  The motion in her right knee is 0 to 125 degrees she has patellofemoral crepitus some discomfort with compression of patella no Baker's cyst no major effusion good medial lateral and anterior posterior stability but some medial discomfort and patellofemoral discomfort.\par \par Her left knee also goes from 0 to 125 degrees good medial lateral and anterior posterior stability again some pain with compression of the patella some patellofemoral crepitus and again some medial pain which radiates down her leg to the toward the ankle which could represent back pain but she has no pain in her back and she has no neurologic changes.\par \par  [de-identified] : 4 views were taken at this time of the right and left knees on the standing and Prather views the joint space is maintained though there are some mild degenerative changes and there may be some chondrocalcinosis of the lateral compartment of the right knee and some narrowing medial compartment right and left knees.  There are some peripheral osteophytes laterally bilaterally.  She does have bone against bone and severe patellofemoral degenerative arthritis bilaterally. \par \par

## 2020-07-28 ENCOUNTER — EMERGENCY (EMERGENCY)
Facility: HOSPITAL | Age: 70
LOS: 1 days | Discharge: HOME | End: 2020-07-28
Attending: EMERGENCY MEDICINE | Admitting: EMERGENCY MEDICINE
Payer: MEDICARE

## 2020-07-28 VITALS
OXYGEN SATURATION: 99 % | HEART RATE: 78 BPM | DIASTOLIC BLOOD PRESSURE: 110 MMHG | RESPIRATION RATE: 15 BRPM | SYSTOLIC BLOOD PRESSURE: 266 MMHG

## 2020-07-28 VITALS
SYSTOLIC BLOOD PRESSURE: 247 MMHG | TEMPERATURE: 98 F | HEART RATE: 85 BPM | DIASTOLIC BLOOD PRESSURE: 99 MMHG | RESPIRATION RATE: 16 BRPM | OXYGEN SATURATION: 100 %

## 2020-07-28 DIAGNOSIS — Z98.891 HISTORY OF UTERINE SCAR FROM PREVIOUS SURGERY: Chronic | ICD-10-CM

## 2020-07-28 LAB
ALBUMIN SERPL ELPH-MCNC: 4.8 G/DL — SIGNIFICANT CHANGE UP (ref 3.3–5)
ALP SERPL-CCNC: 75 U/L — SIGNIFICANT CHANGE UP (ref 40–120)
ALT FLD-CCNC: 31 U/L — SIGNIFICANT CHANGE UP (ref 4–33)
ANION GAP SERPL CALC-SCNC: 15 MMO/L — HIGH (ref 7–14)
AST SERPL-CCNC: 25 U/L — SIGNIFICANT CHANGE UP (ref 4–32)
BASOPHILS # BLD AUTO: 0.06 K/UL — SIGNIFICANT CHANGE UP (ref 0–0.2)
BASOPHILS NFR BLD AUTO: 0.6 % — SIGNIFICANT CHANGE UP (ref 0–2)
BILIRUB SERPL-MCNC: 0.6 MG/DL — SIGNIFICANT CHANGE UP (ref 0.2–1.2)
BUN SERPL-MCNC: 11 MG/DL — SIGNIFICANT CHANGE UP (ref 7–23)
CALCIUM SERPL-MCNC: 9.5 MG/DL — SIGNIFICANT CHANGE UP (ref 8.4–10.5)
CHLORIDE SERPL-SCNC: 100 MMOL/L — SIGNIFICANT CHANGE UP (ref 98–107)
CO2 SERPL-SCNC: 24 MMOL/L — SIGNIFICANT CHANGE UP (ref 22–31)
CREAT SERPL-MCNC: 0.77 MG/DL — SIGNIFICANT CHANGE UP (ref 0.5–1.3)
EOSINOPHIL # BLD AUTO: 0.42 K/UL — SIGNIFICANT CHANGE UP (ref 0–0.5)
EOSINOPHIL NFR BLD AUTO: 4.5 % — SIGNIFICANT CHANGE UP (ref 0–6)
GLUCOSE SERPL-MCNC: 117 MG/DL — HIGH (ref 70–99)
HCT VFR BLD CALC: 43.7 % — SIGNIFICANT CHANGE UP (ref 34.5–45)
HGB BLD-MCNC: 14.6 G/DL — SIGNIFICANT CHANGE UP (ref 11.5–15.5)
IMM GRANULOCYTES NFR BLD AUTO: 0.8 % — SIGNIFICANT CHANGE UP (ref 0–1.5)
LYMPHOCYTES # BLD AUTO: 1.75 K/UL — SIGNIFICANT CHANGE UP (ref 1–3.3)
LYMPHOCYTES # BLD AUTO: 18.9 % — SIGNIFICANT CHANGE UP (ref 13–44)
MCHC RBC-ENTMCNC: 30.9 PG — SIGNIFICANT CHANGE UP (ref 27–34)
MCHC RBC-ENTMCNC: 33.4 % — SIGNIFICANT CHANGE UP (ref 32–36)
MCV RBC AUTO: 92.4 FL — SIGNIFICANT CHANGE UP (ref 80–100)
MONOCYTES # BLD AUTO: 0.74 K/UL — SIGNIFICANT CHANGE UP (ref 0–0.9)
MONOCYTES NFR BLD AUTO: 8 % — SIGNIFICANT CHANGE UP (ref 2–14)
NEUTROPHILS # BLD AUTO: 6.22 K/UL — SIGNIFICANT CHANGE UP (ref 1.8–7.4)
NEUTROPHILS NFR BLD AUTO: 67.2 % — SIGNIFICANT CHANGE UP (ref 43–77)
NRBC # FLD: 0 K/UL — SIGNIFICANT CHANGE UP (ref 0–0)
PLATELET # BLD AUTO: 228 K/UL — SIGNIFICANT CHANGE UP (ref 150–400)
PMV BLD: 11.2 FL — SIGNIFICANT CHANGE UP (ref 7–13)
POTASSIUM SERPL-MCNC: 3.8 MMOL/L — SIGNIFICANT CHANGE UP (ref 3.5–5.3)
POTASSIUM SERPL-SCNC: 3.8 MMOL/L — SIGNIFICANT CHANGE UP (ref 3.5–5.3)
PROT SERPL-MCNC: 7.9 G/DL — SIGNIFICANT CHANGE UP (ref 6–8.3)
RBC # BLD: 4.73 M/UL — SIGNIFICANT CHANGE UP (ref 3.8–5.2)
RBC # FLD: 11.9 % — SIGNIFICANT CHANGE UP (ref 10.3–14.5)
SODIUM SERPL-SCNC: 139 MMOL/L — SIGNIFICANT CHANGE UP (ref 135–145)
TROPONIN T, HIGH SENSITIVITY: 8 NG/L — SIGNIFICANT CHANGE UP (ref ?–14)
TROPONIN T, HIGH SENSITIVITY: 9 NG/L — SIGNIFICANT CHANGE UP (ref ?–14)
WBC # BLD: 9.26 K/UL — SIGNIFICANT CHANGE UP (ref 3.8–10.5)
WBC # FLD AUTO: 9.26 K/UL — SIGNIFICANT CHANGE UP (ref 3.8–10.5)

## 2020-07-28 PROCEDURE — 99283 EMERGENCY DEPT VISIT LOW MDM: CPT

## 2020-07-28 RX ORDER — LISINOPRIL 2.5 MG/1
5 TABLET ORAL ONCE
Refills: 0 | Status: COMPLETED | OUTPATIENT
Start: 2020-07-28 | End: 2020-07-28

## 2020-07-28 RX ORDER — LISINOPRIL 2.5 MG/1
1 TABLET ORAL
Qty: 30 | Refills: 0
Start: 2020-07-28 | End: 2020-08-26

## 2020-07-28 RX ADMIN — LISINOPRIL 5 MILLIGRAM(S): 2.5 TABLET ORAL at 21:26

## 2020-07-28 NOTE — ED ADULT NURSE REASSESSMENT NOTE - NS ED NURSE REASSESS COMMENT FT1
received report from KURT Frazier pt aox4 in no apparent distress denies any complaints not active epitaxis at this time. Pt remain hypertensive MD Burkett aware. As per MD monitor pt until lab results then will medicated BP appropriately NSR on cardiac monitor will continue to monitor closely

## 2020-07-28 NOTE — ED PROVIDER NOTE - CLINICAL SUMMARY MEDICAL DECISION MAKING FREE TEXT BOX
Recurrent epistaxis likely 2/2 uncontrolled htn in adult F. Needs a pcp and to be started on BP meds but will get basic labs first to assess kidney and liver function. No bleeding at this time, will continue to monitor.

## 2020-07-28 NOTE — ED PROVIDER NOTE - NS ED ROS FT
Constitutional: no fevers, no chills.  Eyes: no visual changes.  Ears: no ear drainage, no ear pain.  Nose: no nasal congestion. +epistaxis  Mouth/Throat: no sore throat.  Cardiovascular: no chest pain.  Respiratory: no shortness of breath, no wheezing, no cough  Gastrointestinal: no nausea, no vomiting, no diarrhea, no abdominal pain.  MSK: no flank pain, no back pain.  Genitourinary: no dysuria, no hematuria.  Skin: no rashes.  Neuro: no headache

## 2020-07-28 NOTE — ED PROVIDER NOTE - NSFOLLOWUPINSTRUCTIONS_ED_ALL_ED_FT
1) Please Follow up with PMD/Clinic within 2-3 days. Call our Medicine clinic if unable to make appointment with Dr Lee, your 's PCP.     2) Also take blood pressure medications as prescribed.     3) Please take Tylenol 650mg every 4-6 hours as needed for pain.    4) Please return to  Emergency Department for any new or worsening symptoms.

## 2020-07-28 NOTE — ED PROVIDER NOTE - OBJECTIVE STATEMENT
69F hx htn not on meds presents with intermittent epistaxis that has now stopped but started around 330pm. Some clots were passed. According to pt, this often happens when her BP gets high and wants to be started on BP medications. Patient denies chest pain, SOB, f/c, cough, abd pain, N/V/D/C, weakness, HA, dizziness, urinary symptoms, extremity pain or swelling or other complaints.

## 2020-07-28 NOTE — ED ADULT NURSE NOTE - CHIEF COMPLAINT QUOTE
Pt c/o nose bleed since 330pm.  Denies headache, blurry vision, use of blood thinner or dizziness.  Pt BP high at triage. Pt is non compliant with BP meds x 15 yrs

## 2020-07-28 NOTE — ED PROVIDER NOTE - ATTENDING CONTRIBUTION TO CARE
69 year old with recurrent nosebleed and htn. patient refused outpatient htn meds.  nosebleed stopped spontaneously.  BP elevated. will check for signs of end organ damage. dc with lisinopril and PMD f/u.

## 2020-07-28 NOTE — ED ADULT NURSE NOTE - OBJECTIVE STATEMENT
68 y/o F received to room 12 c/o nose bleed. Pt A&Ox4 and ambulatory. Pt states she had two nosebleed at 3pm today that each lasted 30 mins. Pt states last time this occurred her BP was elevated. pt states at the time of the nose bleeds she was experiencing neck pressure. Pt respirations even and unlabored. pt abdomen soft nontender nondistended. Pt skin intact. Pt denies sob, aponte, cp, palpitations, fevers, chills or n/v/d. Vital signs as noted, call bell in md pee evaluated, comfort measures 20G IV placed R AC, labs drawn and sent will continue to monitor.

## 2020-07-28 NOTE — ED PROVIDER NOTE - PHYSICAL EXAMINATION
Physical Examination: Gen: NAD, non-toxic, conversational  Eyes: PERRLA, EOMI   HENT: Normocephalic, atraumatic. External ears normal, nares with dried blood without active bleeding, no oral cavity blood, no rhinorrhea, moist mucous membranes.   CV: RRR, no M/R/G  Resp: CTAB, non-labored, speaking without difficulty on room air  Abd: soft, non-tender, non rigid, no guarding or rebound tenderness  Back: No CVAT bilaterally, no midline ttp  Skin: dry, wwp   Neuro: AOx3, speech is fluent and appropriate  Psych: Mood okay, affect euthymic

## 2020-07-28 NOTE — ED PROVIDER NOTE - PATIENT PORTAL LINK FT
You can access the FollowMyHealth Patient Portal offered by [facility] by registering at the following website: [email or web address]. By joining [facility]’s FollowMyHealth portal, you will also be able to view your health information using other applications (apps) compatible with our system.

## 2020-07-28 NOTE — ED ADULT NURSE NOTE - NSIMPLEMENTINTERV_GEN_ALL_ED
Implemented All Universal Safety Interventions:  [place] to call system. Call bell, personal items and telephone within reach. Instruct patient to call for assistance. Room bathroom lighting operational. Non-slip footwear when patient is off stretcher. Physically safe environment: no spills, clutter or unnecessary equipment. Stretcher in lowest position, wheels locked, appropriate side rails in place.

## 2020-08-03 ENCOUNTER — NON-APPOINTMENT (OUTPATIENT)
Age: 70
End: 2020-08-03

## 2020-08-03 ENCOUNTER — APPOINTMENT (OUTPATIENT)
Dept: CARDIOLOGY | Facility: CLINIC | Age: 70
End: 2020-08-03
Payer: MEDICARE

## 2020-08-03 VITALS
SYSTOLIC BLOOD PRESSURE: 155 MMHG | DIASTOLIC BLOOD PRESSURE: 95 MMHG | BODY MASS INDEX: 33.49 KG/M2 | WEIGHT: 182 LBS | RESPIRATION RATE: 15 BRPM | HEART RATE: 56 BPM | HEIGHT: 62 IN

## 2020-08-03 DIAGNOSIS — I10 ESSENTIAL (PRIMARY) HYPERTENSION: ICD-10-CM

## 2020-08-03 DIAGNOSIS — R01.1 CARDIAC MURMUR, UNSPECIFIED: ICD-10-CM

## 2020-08-03 PROCEDURE — 99203 OFFICE O/P NEW LOW 30 MIN: CPT

## 2020-08-03 PROCEDURE — 93000 ELECTROCARDIOGRAM COMPLETE: CPT

## 2020-08-03 RX ORDER — ASPIRIN 325 MG/1
325 TABLET, FILM COATED ORAL DAILY
Qty: 14 | Refills: 0 | Status: COMPLETED | COMMUNITY
Start: 2017-07-21 | End: 2020-08-03

## 2020-08-04 RX ORDER — ACETAMINOPHEN AND CODEINE 300; 30 MG/1; MG/1
300-30 TABLET ORAL
Qty: 60 | Refills: 0 | Status: COMPLETED | COMMUNITY
Start: 2017-07-03 | End: 2020-08-04

## 2020-08-04 RX ORDER — CEPHALEXIN 500 MG/1
500 CAPSULE ORAL 3 TIMES DAILY
Qty: 21 | Refills: 0 | Status: COMPLETED | COMMUNITY
Start: 2017-08-08 | End: 2020-08-04

## 2020-08-04 RX ORDER — DICLOFENAC SODIUM 75 MG/1
75 TABLET, DELAYED RELEASE ORAL
Qty: 60 | Refills: 2 | Status: COMPLETED | COMMUNITY
Start: 2019-06-11 | End: 2020-08-04

## 2020-08-04 RX ORDER — CELECOXIB 200 MG/1
200 CAPSULE ORAL DAILY
Qty: 60 | Refills: 2 | Status: COMPLETED | COMMUNITY
Start: 2020-03-10 | End: 2020-08-04

## 2020-08-14 RX ORDER — LISINOPRIL 10 MG/1
10 TABLET ORAL
Refills: 0 | Status: ACTIVE | COMMUNITY

## 2020-08-14 RX ORDER — TELMISARTAN 80 MG/1
80 TABLET ORAL DAILY
Qty: 90 | Refills: 1 | Status: DISCONTINUED | COMMUNITY
Start: 2020-08-03 | End: 2020-08-14

## 2020-10-07 ENCOUNTER — APPOINTMENT (OUTPATIENT)
Dept: CARDIOLOGY | Facility: CLINIC | Age: 70
End: 2020-10-07

## 2022-04-25 NOTE — ED ADULT TRIAGE NOTE - CHIEF COMPLAINT QUOTE
Pt c/o nose bleed since 330pm.  Denies headache, blurry vision, use of blood thinner or dizziness.  Pt BP high at triage. Pt is non compliant with BP meds x 15 yrs no

## 2025-05-19 ENCOUNTER — APPOINTMENT (OUTPATIENT)
Age: 75
End: 2025-05-19
Payer: MEDICARE

## 2025-05-19 ENCOUNTER — NON-APPOINTMENT (OUTPATIENT)
Age: 75
End: 2025-05-19

## 2025-05-19 ENCOUNTER — APPOINTMENT (OUTPATIENT)
Age: 75
End: 2025-05-19

## 2025-05-19 PROCEDURE — 92014 COMPRE OPH EXAM EST PT 1/>: CPT

## 2025-05-19 PROCEDURE — 92202 OPSCPY EXTND ON/MAC DRAW: CPT

## 2025-05-19 PROCEDURE — 92015 DETERMINE REFRACTIVE STATE: CPT

## 2025-05-19 PROCEDURE — 92134 CPTRZ OPH DX IMG PST SGM RTA: CPT

## 2025-05-19 PROCEDURE — 92285 EXTERNAL OCULAR PHOTOGRAPHY: CPT
